# Patient Record
(demographics unavailable — no encounter records)

---

## 2024-10-14 NOTE — PHYSICAL EXAM
[Chaperone Present] : A chaperone was present in the examining room during all aspects of the physical examination [FreeTextEntry2] : Feli Boone MA

## 2024-10-14 NOTE — HISTORY OF PRESENT ILLNESS
[Patient reported PAP Smear was normal] : Patient reported PAP Smear was normal [Vasectomy (partner)] : has a partner with a vasectomy [Y] : Positive pregnancy history [TextBox_19] : BR1 [Mammogramdate] : 11/20/2023 [BreastSonogramDate] : 11/20/2023 [TextBox_25] : BR2 [PapSmeardate] : 09/19/2024 [LMPDate] : 09/19/2024 [PGHxTotal] : 2 [[facility] : 2 [[facility] Corporation[facility]] : 2 [Menarche Age: ____] : age at menarche was [unfilled] [Currently Active] : currently active [FreeTextEntry1] : 09/19/2024

## 2024-11-27 NOTE — PLAN
[No new medications perscribed] : Treat in place: No new medications prescribed [FreeTextEntry1] : You were evaluated for cough. We advise supportive care with plenty of hydration and over the counter cold medications per package instructions as needed. An electronic percription for resipiratory viruses was sent to [facility], as well as a prescription for a chest xray. Seek medical attention if your symptoms worsen or if you have any concerns.

## 2024-11-27 NOTE — HISTORY OF PRESENT ILLNESS
[Home] : at home, [unfilled] , at the time of the visit. [Other Location: e.g. Home (Enter Location, City,State)___] : at [unfilled] [Verbal consent obtained from patient] : the patient, [unfilled] [FreeTextEntry8] : 38F p/w dry "tickling" cough for one day. , daughter, and son all have a cough and were seen at an Urgent Care this week and prescribed steroids and Z-packs. They tested negative for COVID. No fever. No SOB. PMH: Migraines Meds: Ubrelvy